# Patient Record
Sex: FEMALE | Employment: FULL TIME | URBAN - METROPOLITAN AREA
[De-identification: names, ages, dates, MRNs, and addresses within clinical notes are randomized per-mention and may not be internally consistent; named-entity substitution may affect disease eponyms.]

---

## 2017-10-06 ENCOUNTER — OFFICE VISIT (OUTPATIENT)
Dept: OPTOMETRY | Facility: CLINIC | Age: 32
End: 2017-10-06
Payer: COMMERCIAL

## 2017-10-06 ENCOUNTER — OFFICE VISIT (OUTPATIENT)
Dept: OPTOMETRY | Facility: CLINIC | Age: 32
End: 2017-10-06

## 2017-10-06 ENCOUNTER — CLINICAL SUPPORT (OUTPATIENT)
Dept: OPHTHALMOLOGY | Facility: CLINIC | Age: 32
End: 2017-10-06
Payer: COMMERCIAL

## 2017-10-06 DIAGNOSIS — Z79.899 ENCOUNTER FOR LONG-TERM (CURRENT) USE OF HIGH-RISK MEDICATION: Primary | ICD-10-CM

## 2017-10-06 DIAGNOSIS — H52.13 MYOPIA OF BOTH EYES: ICD-10-CM

## 2017-10-06 DIAGNOSIS — H52.13 MYOPIA OF BOTH EYES: Primary | ICD-10-CM

## 2017-10-06 DIAGNOSIS — I77.6 VASCULITIS: ICD-10-CM

## 2017-10-06 DIAGNOSIS — H04.123 INSUFFICIENCY OF TEAR FILM OF BOTH EYES: ICD-10-CM

## 2017-10-06 PROCEDURE — 92499 UNLISTED OPH SVC/PROCEDURE: CPT | Mod: ,,, | Performed by: OPTOMETRIST

## 2017-10-06 PROCEDURE — 99999 PR PBB SHADOW E&M-NEW PATIENT-LVL II: CPT | Mod: PBBFAC,,, | Performed by: OPTOMETRIST

## 2017-10-06 PROCEDURE — 92083 EXTENDED VISUAL FIELD XM: CPT | Mod: S$GLB,,, | Performed by: OPTOMETRIST

## 2017-10-06 PROCEDURE — 92134 CPTRZ OPH DX IMG PST SGM RTA: CPT | Mod: S$GLB,,, | Performed by: OPTOMETRIST

## 2017-10-06 RX ORDER — AZELASTINE HYDROCHLORIDE AND FLUTICASONE PROPIONATE 137; 50 UG/1; UG/1
1 SPRAY, METERED NASAL 2 TIMES DAILY
Refills: 0 | COMMUNITY
Start: 2017-07-22 | End: 2018-03-06

## 2017-10-06 RX ORDER — LORAZEPAM 1 MG/1
TABLET ORAL
Refills: 1 | COMMUNITY
Start: 2017-07-28

## 2017-10-06 NOTE — PROGRESS NOTES
HPI     Last eye exam was approximately 1 year ago.  Patient states has severe dry eye and has noticed slight decrease in   comfort with SCL's. Has been on Restasis for several years. Patient   removes and replaces contact lenses every 1 day. Has tried AV 1 Day Moist,   Total 1, AV TruEyes but they weren't comfortable. Occasionally sees   floaters OU.  Patient denies diplopia, headaches, flashes/floaters, and pain.      Restasis BID OU    Plaquenil 200 mg prn PO (for vasculitis hives) (hasn't taken in several   months)      Last edited by Kayli Blas on 10/6/2017 11:11 AM. (History)            Assessment /Plan     For exam results, see Encounter Report.    Encounter for long-term (current) use of high-risk medication  -     Roberts Visual Field - OU - Extended - Both Eyes  -     OCT- Retina    Vasculitis  -     Roberts Visual Field - OU - Extended - Both Eyes  -     OCT- Retina    Insufficiency of tear film of both eyes    Myopia of both eyes            1-2.  Patient has been taking Plaquenil on and off for the last several years.  All testing normal OU--no retinopathy.  Monitor yearly.  3.  Continue with Restasis bid OU.  Supplement with artificial tears as needed.  Does have punctal plugs in lower lids OU.  Discussed Xiidra.  4.  Glasses and contact lens rx given.          RTC 1 year for routine exam.

## 2018-03-02 ENCOUNTER — TELEPHONE (OUTPATIENT)
Dept: OBSTETRICS AND GYNECOLOGY | Facility: CLINIC | Age: 33
End: 2018-03-02

## 2018-03-02 DIAGNOSIS — N83.209 CYST OF OVARY, UNSPECIFIED LATERALITY: Primary | ICD-10-CM

## 2018-03-02 NOTE — TELEPHONE ENCOUNTER
Pt states she sees a doctor in [place] who diagnosed her with an Endometrial cyst on the ovary.  She has been on birth control x 2 months but has been unable to reach the office to get another rx to continue OCPs.  She has been off of birth control x1 week.  She needs a f/u US but also wants a 2nd opinion.  Spoke to janes, scheduled her with Dr. Donis 3/6, US after appt with Dr. Donis, aware of US prep.  Aware of location.     Emailed record release to pt, instructed her to fill it out and fax it back to the office so we can get it faxed out today.      Her mother has Endometrial cancer

## 2018-03-02 NOTE — TELEPHONE ENCOUNTER
Np. Pt sees an OBGYN in [place] but works out here and is being told that her Dr suspects an endometreal cyst on her ovary.  Pt needs another u/s but would also like a second opinion.    [phone]

## 2018-03-06 ENCOUNTER — OFFICE VISIT (OUTPATIENT)
Dept: OBSTETRICS AND GYNECOLOGY | Facility: CLINIC | Age: 33
End: 2018-03-06
Attending: OBSTETRICS & GYNECOLOGY
Payer: COMMERCIAL

## 2018-03-06 VITALS
WEIGHT: 129.88 LBS | BODY MASS INDEX: 23.01 KG/M2 | DIASTOLIC BLOOD PRESSURE: 72 MMHG | HEIGHT: 63 IN | SYSTOLIC BLOOD PRESSURE: 118 MMHG

## 2018-03-06 DIAGNOSIS — N83.292 COMPLEX CYST OF LEFT OVARY: Primary | ICD-10-CM

## 2018-03-06 DIAGNOSIS — N32.89 BLADDER SPASMS: ICD-10-CM

## 2018-03-06 PROCEDURE — 87086 URINE CULTURE/COLONY COUNT: CPT

## 2018-03-06 PROCEDURE — 99999 PR PBB SHADOW E&M-EST. PATIENT-LVL III: CPT | Mod: PBBFAC,,, | Performed by: OBSTETRICS & GYNECOLOGY

## 2018-03-06 PROCEDURE — 99203 OFFICE O/P NEW LOW 30 MIN: CPT | Mod: S$GLB,,, | Performed by: OBSTETRICS & GYNECOLOGY

## 2018-03-06 RX ORDER — LEVOTHYROXINE SODIUM 75 UG/1
75 TABLET ORAL
COMMUNITY
Start: 2013-05-17

## 2018-03-06 NOTE — Clinical Note
Please call patient to schedule annual after April 25. I put order in for ultrasound to be done in 3 months from now; please schedule.

## 2018-03-06 NOTE — PROGRESS NOTES
Subjective:       Patient ID: Lyndsey Henderson is a 33 y.o. female.    Chief Complaint:  Ovarian Cyst (left, second opinion before surgery)      History of Present Illness  - patient presents with known left ovarian cyst; was followed on u/s Nov and Dec 2017 with no change (approx 3 x 2 cm). Patient has nonspecific pelvic pain: across her pelvis, intermittent, has some low back pain, has bladder spasms(?) in the morning. Denies dysuria. Patient was started on ocps (LoLoestrin) in December for 2 months with plans to repeat u/s to see if cyst is resolving. Stopped ocps 2 weeks ago when ran out. She has not had a period since finishing the last pack. (Patient reports that LoLoestrin is not covered by her insurance. Srinivas Ramos was called in but she has not started it yet.) Patient reports that when she's not on ocps, she has regular periods. She is not currently sexually active. Patient reports she has a h/o pelvic pain and ovarian cysts.  - patient has a h/o dry eyes which worsened with LoLoestrin. She has a remote history of idiopathic dermatologic vasculitis; was initially reluctant to start ocps because she didn't know if that would cause a relapse.  - patient's mother has endometrial cancer. Patient is very concerned that if she has to have surgery that any cancer cells could be spread in her abdomen.  - she is due for a repeat u/s to follow up the cyst. Patient is very concerned about endometriosis.    Past Medical History:   Diagnosis Date    Dry eye syndrome     Hashimoto's thyroiditis     Polycystic ovaries     Vasculitis 10 years ago approx.       Past Surgical History:   Procedure Laterality Date    APPENDECTOMY  11/2008    PUNCTAL CLOSURE- PLUG Bilateral          Current Outpatient Prescriptions:     levothyroxine (SYNTHROID) 75 MCG tablet, Take 75 mcg by mouth., Disp: , Rfl:     lorazepam (ATIVAN) 1 MG tablet, TAKE 1/2 TO 1 TABLET BY MOUTH DAILY AS NEEDED, Disp: , Rfl: 1    Review of patient's allergies  "indicates:   Allergen Reactions    Adhesive Hives       GYN & OB History  Patient's last menstrual period was 2018.   Date of Last Pap: 2017    OB History    Para Term  AB Living   0 0 0 0 0 0   SAB TAB Ectopic Multiple Live Births   0 0 0 0 0             Social History     Social History    Marital status: Single     Spouse name: N/A    Number of children: N/A    Years of education: N/A     Occupational History    Not on file.     Social History Main Topics    Smoking status: Never Smoker    Smokeless tobacco: Never Used    Alcohol use Yes      Comment: socially     Drug use: No    Sexual activity: Not Currently     Other Topics Concern    Not on file     Social History Narrative    No narrative on file       Family History   Problem Relation Age of Onset    Cancer Mother     Hashimoto's thyroiditis Mother     Endometrial cancer Mother     Hypertension Father     Thyroid disease Maternal Grandmother     Lymphoma Maternal Grandfather     Breast cancer Neg Hx     Colon cancer Neg Hx     Ovarian cancer Neg Hx        Review of Systems  Review of Systems   Respiratory: Negative for shortness of breath.    Cardiovascular: Negative for chest pain and palpitations.   Gastrointestinal: Negative for blood in stool, nausea and vomiting.   Genitourinary:        - see HPI   Skin: Negative for rash and wound.   Allergic/Immunologic: Negative for immunocompromised state.   Neurological: Negative for dizziness and syncope.   Hematological: Negative for adenopathy.   Psychiatric/Behavioral: Negative for behavioral problems.        Objective:     Vitals:    18 1035   BP: 118/72   Weight: 58.9 kg (129 lb 13.6 oz)   Height: 5' 3" (1.6 m)       Physical Exam:   Constitutional: She appears well-developed and well-nourished. She is cooperative. No distress.                           Neurological: She is alert.          Assessment/ Plan:     Orders Placed This Encounter    Urine culture "    US Pelvis Comp with Transvag NON-OB (xpd       Lyndsey was seen today for ovarian cyst.    Diagnoses and all orders for this visit:    Complex cyst of left ovary  -     US Pelvis Comp with Transvag NON-OB (xpd; Future    Bladder spasms  -     Urine culture    - reviewed u/s with patient: reassured that cyst is smaller than previous one and appears hemorrhagic. No need for surgical intervention at this time.  - recommend she start the Blisovi and continue until repeating u/s in 3 months. May have less issues with dry eye with this 21 day ocps rather than the 26 day ocp  - annual exam is due after 4/25/17; we will call her to schedule.    Follow-up in about 7 weeks (around 4/26/2018) for annual exam.

## 2018-03-07 ENCOUNTER — TELEPHONE (OUTPATIENT)
Dept: OBSTETRICS AND GYNECOLOGY | Facility: CLINIC | Age: 33
End: 2018-03-07

## 2018-03-07 LAB — BACTERIA UR CULT: NORMAL

## 2018-03-07 NOTE — TELEPHONE ENCOUNTER
Spoke with pt and informed of appts needed. She states she will look at her work schedule and call to schedule.

## 2018-03-07 NOTE — TELEPHONE ENCOUNTER
----- Message from Gina Donis MD sent at 3/6/2018  3:30 PM CST -----  Please call patient to schedule annual after April 25. I put order in for ultrasound to be done in 3 months from now; please schedule.

## 2018-03-23 ENCOUNTER — PATIENT MESSAGE (OUTPATIENT)
Dept: OBSTETRICS AND GYNECOLOGY | Facility: CLINIC | Age: 33
End: 2018-03-23

## 2018-09-11 ENCOUNTER — TELEPHONE (OUTPATIENT)
Dept: OPTOMETRY | Facility: CLINIC | Age: 33
End: 2018-09-11

## 2018-12-11 ENCOUNTER — OFFICE VISIT (OUTPATIENT)
Dept: INTERNAL MEDICINE | Facility: CLINIC | Age: 33
End: 2018-12-11
Payer: COMMERCIAL

## 2018-12-11 VITALS
DIASTOLIC BLOOD PRESSURE: 60 MMHG | WEIGHT: 128.5 LBS | SYSTOLIC BLOOD PRESSURE: 124 MMHG | OXYGEN SATURATION: 99 % | BODY MASS INDEX: 22.77 KG/M2 | TEMPERATURE: 98 F | HEIGHT: 63 IN | HEART RATE: 91 BPM

## 2018-12-11 DIAGNOSIS — H91.92 UNILATERAL HEARING LOSS, LEFT: Primary | ICD-10-CM

## 2018-12-11 PROCEDURE — 99213 OFFICE O/P EST LOW 20 MIN: CPT | Mod: 25,S$GLB,, | Performed by: INTERNAL MEDICINE

## 2018-12-11 PROCEDURE — 3008F BODY MASS INDEX DOCD: CPT | Mod: CPTII,S$GLB,, | Performed by: INTERNAL MEDICINE

## 2018-12-11 PROCEDURE — 99999 PR PBB SHADOW E&M-EST. PATIENT-LVL III: CPT | Mod: PBBFAC,,, | Performed by: INTERNAL MEDICINE

## 2018-12-11 RX ORDER — PREDNISONE 20 MG/1
60 TABLET ORAL ONCE
Status: COMPLETED | OUTPATIENT
Start: 2018-12-11 | End: 2018-12-11

## 2018-12-11 RX ADMIN — PREDNISONE 60 MG: 20 TABLET ORAL at 05:12

## 2018-12-12 ENCOUNTER — TELEPHONE (OUTPATIENT)
Dept: OTOLARYNGOLOGY | Facility: CLINIC | Age: 33
End: 2018-12-12

## 2018-12-12 ENCOUNTER — OFFICE VISIT (OUTPATIENT)
Dept: OTOLARYNGOLOGY | Facility: CLINIC | Age: 33
End: 2018-12-12
Payer: COMMERCIAL

## 2018-12-12 ENCOUNTER — CLINICAL SUPPORT (OUTPATIENT)
Dept: AUDIOLOGY | Facility: CLINIC | Age: 33
End: 2018-12-12
Payer: COMMERCIAL

## 2018-12-12 VITALS — HEART RATE: 86 BPM | SYSTOLIC BLOOD PRESSURE: 120 MMHG | DIASTOLIC BLOOD PRESSURE: 78 MMHG | TEMPERATURE: 99 F

## 2018-12-12 DIAGNOSIS — H93.19 TINNITUS, UNSPECIFIED LATERALITY: Primary | ICD-10-CM

## 2018-12-12 DIAGNOSIS — H91.92 HEARING LOSS OF LEFT EAR, UNSPECIFIED HEARING LOSS TYPE: Primary | ICD-10-CM

## 2018-12-12 DIAGNOSIS — H93.12 TINNITUS, LEFT: ICD-10-CM

## 2018-12-12 PROCEDURE — 92557 COMPREHENSIVE HEARING TEST: CPT | Mod: S$GLB,,, | Performed by: AUDIOLOGIST

## 2018-12-12 PROCEDURE — 99999 PR PBB SHADOW E&M-EST. PATIENT-LVL I: CPT | Mod: PBBFAC,,,

## 2018-12-12 PROCEDURE — 99999 PR PBB SHADOW E&M-EST. PATIENT-LVL III: CPT | Mod: PBBFAC,,, | Performed by: OTOLARYNGOLOGY

## 2018-12-12 PROCEDURE — 92550 TYMPANOMETRY & REFLEX THRESH: CPT | Mod: S$GLB,,, | Performed by: AUDIOLOGIST

## 2018-12-12 PROCEDURE — 99203 OFFICE O/P NEW LOW 30 MIN: CPT | Mod: S$GLB,,, | Performed by: OTOLARYNGOLOGY

## 2018-12-12 NOTE — PROGRESS NOTES
Subjective:       Patient ID: Lyndsey Henderson is a 33 y.o. female.    Chief Complaint: Hearing Loss    HPI     Lyndsey Henderson is a 33 y.o. female presents for sudden hearing loss and tinnitus.  Onset was sudden occurred yesterday treated with steroids by urgent care.  Has noted improvement feels hearing is back in the normal at this point, but was advised to get it checked out.  No prior otologic history    Review of Systems   Constitutional: Negative for chills, fever and unexpected weight change.   HENT: Negative for sore throat and trouble swallowing.    Eyes: Negative for pain and visual disturbance.   Respiratory: Negative for apnea and shortness of breath.    Cardiovascular: Negative for chest pain and palpitations.   Gastrointestinal: Negative for abdominal pain and nausea.   Endocrine: Negative for cold intolerance and heat intolerance.   Musculoskeletal: Negative for joint swelling and neck stiffness.   Skin: Negative for color change and rash.   Neurological: Negative for facial asymmetry and headaches.   Hematological: Negative for adenopathy. Does not bruise/bleed easily.   Psychiatric/Behavioral: Negative for agitation. The patient is not nervous/anxious.        Objective:      Physical Exam   Constitutional: She is oriented to person, place, and time. She appears well-developed and well-nourished. No distress.   HENT:   Head: Normocephalic and atraumatic.   Right Ear: Tympanic membrane, external ear and ear canal normal.   Left Ear: Tympanic membrane, external ear and ear canal normal.   Nose: Nose normal.   Mouth/Throat: Uvula is midline, oropharynx is clear and moist and mucous membranes are normal.   Eric: Midline  Rinne: AC > BC @ 512Hz   Eyes: Conjunctivae and EOM are normal. Pupils are equal, round, and reactive to light.   Neck: Normal range of motion. No tracheal deviation present. No thyromegaly present.   Cardiovascular: Normal rate and regular rhythm.   Pulmonary/Chest: Effort normal. No  respiratory distress.   Musculoskeletal: Normal range of motion.   Lymphadenopathy:        Head (right side): No submental, no submandibular and no tonsillar adenopathy present.        Head (left side): No submental, no submandibular and no tonsillar adenopathy present.     She has no cervical adenopathy.   Neurological: She is alert and oriented to person, place, and time.   Psychiatric: She has a normal mood and affect. Her behavior is normal.   Nursing note and vitals reviewed.     Data:      Audiogram tracings independently reviewed and discussed with patient.  Pure tones normal AU    Assessment:       1. Hearing loss of left ear, unspecified hearing loss type    2. Tinnitus, left        Subjective hearing loss of left ear treated with steroids.  Audio seems to show back in normal range  Plan:    recommend observation at this time   follow up as needed

## 2018-12-12 NOTE — PROGRESS NOTES
Ms. Henderson was seen today for a hearing evaluation. Ms. Henderson reported unilateral (left) tinnitus and a decrease in hearing (left) yesterday. Ms. Henderson stated she started prednisone yesterday and the tinnitus has since subsided and her hearing has improved as well.     Pure tone audiometry revealed normal hearing, AU  SRT and PTA are in good agreement bilaterally.    SRT was obtained at 10 dB for the right ear with 100% speech discrimination and 5dB for the left ear with 100% speech discrimination.   Tympanometry revealed Type A, AU.   Acoustic Reflexes were present ipsilaterally and 1000 and 2000 Hz., bilaterally     Recommendations:  1. Otologic Evaluation  2. Repeat Audiogram as needed  3. Hearing Protection

## 2018-12-12 NOTE — TELEPHONE ENCOUNTER
----- Message from Alvina Craven sent at 12/12/2018  8:18 AM CST -----  Contact: Self/ [phone]  Patient would like to be seen sooner than the next available appointment. Patient has hearing loss. She went to urgent care and was advise to see ENT.    Please call and advise.

## 2018-12-12 NOTE — TELEPHONE ENCOUNTER
Spoke with patient and sent a message to [facility] providers to see if she can be seen there today. Patient works at [facility] [employer] and is at work now.

## 2018-12-12 NOTE — PROGRESS NOTES
Subjective:       Patient ID: Lyndsey Henderson is a 33 y.o. female.    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 No URI symptoms.  Has history of Hashimto's thyroiditis and and vasculitis treated with plaquenil many years ago      Review of Systems   Constitutional: Negative for activity change, chills, fatigue and fever.   HENT: Negative for congestion, ear pain, nosebleeds, postnasal drip, sinus pressure and sore throat.    Eyes: Negative.  Negative for visual disturbance.   Respiratory: Negative for cough, chest tightness, shortness of breath and wheezing.    Cardiovascular: Negative for chest pain.   Gastrointestinal: Negative for abdominal pain, diarrhea, nausea and vomiting.   Genitourinary: Negative for difficulty urinating, dysuria, frequency and urgency.   Musculoskeletal: Negative for arthralgias and neck stiffness.   Skin: Negative for rash.   Neurological: Negative for dizziness, weakness and headaches.   Psychiatric/Behavioral: Negative for sleep disturbance. The patient is not nervous/anxious.        Objective:      Physical Exam   Constitutional: She is oriented to person, place, and time. She appears well-developed and well-nourished.  Non-toxic appearance. No distress.   HENT:   Head: Normocephalic and atraumatic.   Right Ear: Tympanic membrane, external ear and ear canal normal.   Left Ear: Tympanic membrane, external ear and ear canal normal.   Eyes: EOM are normal. Pupils are equal, round, and reactive to light. No scleral icterus.   Neck: Normal range of motion. Neck supple. No thyromegaly present.   Cardiovascular: Normal rate, regular rhythm and normal heart sounds.   Pulmonary/Chest: Effort normal and breath sounds normal.   Abdominal: Soft. Bowel sounds are normal. She exhibits no mass. There is no tenderness. There is no rebound.   Musculoskeletal: Normal range of motion.   Lymphadenopathy:     She has no  cervical adenopathy.   Neurological: She is alert and oriented to person, place, and time. She has normal reflexes. She displays normal reflexes. No cranial nerve deficit. She exhibits normal muscle tone. Coordination normal.   Skin: Skin is warm and dry.   Psychiatric: She has a normal mood and affect. Her behavior is normal.       Assessment:       1. Unilateral hearing loss, left        Plan:   Lyndsey was seen today for ear fullness.    Diagnoses and all orders for this visit:    Unilateral hearing loss, left    Other orders  -     predniSONE tablet 60 mg    Patient will see her uncle, an ENT doc in [facility]

## 2019-02-05 ENCOUNTER — OFFICE VISIT (OUTPATIENT)
Dept: OBSTETRICS AND GYNECOLOGY | Facility: CLINIC | Age: 34
End: 2019-02-05
Attending: OBSTETRICS & GYNECOLOGY
Payer: COMMERCIAL

## 2019-02-05 VITALS
SYSTOLIC BLOOD PRESSURE: 108 MMHG | BODY MASS INDEX: 23.17 KG/M2 | WEIGHT: 130.75 LBS | DIASTOLIC BLOOD PRESSURE: 60 MMHG | HEIGHT: 63 IN

## 2019-02-05 DIAGNOSIS — Z11.51 SCREENING FOR HPV (HUMAN PAPILLOMAVIRUS): ICD-10-CM

## 2019-02-05 DIAGNOSIS — Z01.419 ENCOUNTER FOR GYNECOLOGICAL EXAMINATION: Primary | ICD-10-CM

## 2019-02-05 DIAGNOSIS — Z12.4 ENCOUNTER FOR PAPANICOLAOU SMEAR FOR CERVICAL CANCER SCREENING: ICD-10-CM

## 2019-02-05 PROCEDURE — 99999 PR PBB SHADOW E&M-EST. PATIENT-LVL III: CPT | Mod: PBBFAC,,, | Performed by: OBSTETRICS & GYNECOLOGY

## 2019-02-05 PROCEDURE — 99395 PR PREVENTIVE VISIT,EST,18-39: ICD-10-PCS | Mod: S$GLB,,, | Performed by: OBSTETRICS & GYNECOLOGY

## 2019-02-05 PROCEDURE — 87624 HPV HI-RISK TYP POOLED RSLT: CPT

## 2019-02-05 PROCEDURE — 88175 CYTOPATH C/V AUTO FLUID REDO: CPT

## 2019-02-05 PROCEDURE — 99999 PR PBB SHADOW E&M-EST. PATIENT-LVL III: ICD-10-PCS | Mod: PBBFAC,,, | Performed by: OBSTETRICS & GYNECOLOGY

## 2019-02-05 PROCEDURE — 99395 PREV VISIT EST AGE 18-39: CPT | Mod: S$GLB,,, | Performed by: OBSTETRICS & GYNECOLOGY

## 2019-02-05 NOTE — PROGRESS NOTES
Subjective:       Patient ID: Lyndsey Henderson is a 33 y.o. female.    Chief Complaint:  Well Woman (pap nl 2017)      History of Present Illness  - here for annual. Regular periods. Thinks the last two may have been a little closer together. Has occasional bladder spasms in the morning. Voids fairly regularly throughout the day. Sometimes hold it a little long.  - did not try ocps.    Past Medical History:   Diagnosis Date    Dry eye syndrome     Hashimoto's thyroiditis     Polycystic ovaries     Vasculitis 10 years ago approx.       Past Surgical History:   Procedure Laterality Date    APPENDECTOMY  2008    PUNCTAL CLOSURE- PLUG Bilateral          Current Outpatient Medications:     cyclosporine (RESTASIS OPHT), Apply to eye., Disp: , Rfl:     levothyroxine (SYNTHROID) 75 MCG tablet, Take 75 mcg by mouth., Disp: , Rfl:     lorazepam (ATIVAN) 1 MG tablet, TAKE 1/2 TO 1 TABLET BY MOUTH DAILY AS NEEDED, Disp: , Rfl: 1    Review of patient's allergies indicates:   Allergen Reactions    Adhesive Hives       GYN & OB History  Patient's last menstrual period was 2019.   Date of Last Pap: 2017    OB History    Para Term  AB Living   0 0 0 0 0 0   SAB TAB Ectopic Multiple Live Births   0 0 0 0 0             Social History     Socioeconomic History    Marital status: Single     Spouse name: Not on file    Number of children: Not on file    Years of education: Not on file    Highest education level: Not on file   Social Needs    Financial resource strain: Not on file    Food insecurity - worry: Not on file    Food insecurity - inability: Not on file    Transportation needs - medical: Not on file    Transportation needs - non-medical: Not on file   Occupational History    Not on file   Tobacco Use    Smoking status: Never Smoker    Smokeless tobacco: Never Used   Substance and Sexual Activity    Alcohol use: Yes     Comment: socially     Drug use: No    Sexual activity: Not  "Currently   Other Topics Concern    Not on file   Social History Narrative    Not on file       Family History   Problem Relation Age of Onset    Cancer Mother     Hashimoto's thyroiditis Mother     Endometrial cancer Mother     Hypertension Father     Thyroid disease Maternal Grandmother     Lymphoma Maternal Grandfather     Breast cancer Neg Hx     Colon cancer Neg Hx     Ovarian cancer Neg Hx        Review of Systems  Review of Systems   Respiratory: Negative for shortness of breath.    Cardiovascular: Negative for chest pain and palpitations.   Gastrointestinal: Negative for blood in stool, nausea and vomiting.   Genitourinary:        - see HPI   Skin: Negative for rash and wound.   Allergic/Immunologic: Negative for immunocompromised state.   Neurological: Negative for dizziness and syncope.   Hematological: Negative for adenopathy.   Psychiatric/Behavioral: Negative for behavioral problems.        Objective:     Vitals:    02/05/19 1129   BP: 108/60   Weight: 59.3 kg (130 lb 11.7 oz)   Height: 5' 3" (1.6 m)       Physical Exam:   Constitutional: She is oriented to person, place, and time. She appears well-developed and well-nourished.        Pulmonary/Chest: Right breast exhibits no mass, no nipple discharge, no skin change, no tenderness and no swelling. Left breast exhibits no mass, no nipple discharge, no skin change, no tenderness and no swelling. Breasts are symmetrical.        Abdominal: Soft. She exhibits no distension. There is no tenderness.     Genitourinary: Vagina normal and uterus normal. There is no tenderness or lesion on the right labia. There is no tenderness or lesion on the left labia. Cervix is normal. Right adnexum displays no mass, no tenderness and no fullness. Left adnexum displays no mass, no tenderness and no fullness. No vaginal discharge found. Additional cervical findings: pap smear done          Musculoskeletal: Moves all extremeties.       Neurological: She is alert " and oriented to person, place, and time.     Psychiatric: She has a normal mood and affect.        Assessment/ Plan:     Orders Placed This Encounter    HPV High Risk Genotypes, PCR    Liquid-based pap smear, screening       Lyndsey was seen today for well woman.    Diagnoses and all orders for this visit:    Encounter for gynecological examination    Encounter for Papanicolaou smear for cervical cancer screening  -     Liquid-based pap smear, screening    Screening for HPV (human papillomavirus)  -     HPV High Risk Genotypes, PCR    - discussed bladder training and avoiding bladder irritants  - patient will call if wants something for cycle control.    Follow-up in about 1 year (around 2/5/2020) for annual exam.

## 2019-02-09 LAB
HPV HR 12 DNA CVX QL NAA+PROBE: NEGATIVE
HPV16 AG SPEC QL: NEGATIVE
HPV18 DNA SPEC QL NAA+PROBE: NEGATIVE